# Patient Record
Sex: MALE | Employment: OTHER | URBAN - METROPOLITAN AREA
[De-identification: names, ages, dates, MRNs, and addresses within clinical notes are randomized per-mention and may not be internally consistent; named-entity substitution may affect disease eponyms.]

---

## 2021-02-17 ENCOUNTER — IMMUNIZATION (OUTPATIENT)
Dept: NURSING | Facility: CLINIC | Age: 86
End: 2021-02-17
Payer: COMMERCIAL

## 2021-02-17 PROCEDURE — 91300 PR COVID VAC PFIZER DIL RECON 30 MCG/0.3 ML IM: CPT

## 2021-02-17 PROCEDURE — 0001A PR COVID VAC PFIZER DIL RECON 30 MCG/0.3 ML IM: CPT

## 2021-03-07 ENCOUNTER — HEALTH MAINTENANCE LETTER (OUTPATIENT)
Age: 86
End: 2021-03-07

## 2021-03-10 ENCOUNTER — IMMUNIZATION (OUTPATIENT)
Dept: NURSING | Facility: CLINIC | Age: 86
End: 2021-03-10
Attending: INTERNAL MEDICINE
Payer: COMMERCIAL

## 2021-03-10 PROCEDURE — 91300 PR COVID VAC PFIZER DIL RECON 30 MCG/0.3 ML IM: CPT

## 2021-03-10 PROCEDURE — 0002A PR COVID VAC PFIZER DIL RECON 30 MCG/0.3 ML IM: CPT

## 2021-10-11 ENCOUNTER — HEALTH MAINTENANCE LETTER (OUTPATIENT)
Age: 86
End: 2021-10-11

## 2022-03-27 ENCOUNTER — HEALTH MAINTENANCE LETTER (OUTPATIENT)
Age: 87
End: 2022-03-27

## 2022-09-25 ENCOUNTER — HEALTH MAINTENANCE LETTER (OUTPATIENT)
Age: 87
End: 2022-09-25

## 2023-05-08 ENCOUNTER — HEALTH MAINTENANCE LETTER (OUTPATIENT)
Age: 88
End: 2023-05-08

## 2025-06-03 ENCOUNTER — TELEPHONE (OUTPATIENT)
Dept: OPHTHALMOLOGY | Facility: CLINIC | Age: 89
End: 2025-06-03
Payer: COMMERCIAL

## 2025-06-03 ENCOUNTER — HOSPITAL ENCOUNTER (EMERGENCY)
Facility: CLINIC | Age: 89
Discharge: HOME | End: 2025-06-03
Attending: EMERGENCY MEDICINE | Admitting: EMERGENCY MEDICINE
Payer: COMMERCIAL

## 2025-06-03 ENCOUNTER — OFFICE VISIT (OUTPATIENT)
Dept: OPHTHALMOLOGY | Facility: CLINIC | Age: 89
End: 2025-06-03
Attending: OPHTHALMOLOGY
Payer: COMMERCIAL

## 2025-06-03 VITALS
RESPIRATION RATE: 18 BRPM | HEIGHT: 70 IN | TEMPERATURE: 99 F | BODY MASS INDEX: 21.9 KG/M2 | OXYGEN SATURATION: 97 % | SYSTOLIC BLOOD PRESSURE: 141 MMHG | HEART RATE: 88 BPM | WEIGHT: 153 LBS | DIASTOLIC BLOOD PRESSURE: 76 MMHG

## 2025-06-03 DIAGNOSIS — B00.9 HERPES SIMPLEX: Primary | ICD-10-CM

## 2025-06-03 DIAGNOSIS — B02.30 HERPES ZOSTER WITH OPHTHALMIC COMPLICATION, UNSPECIFIED HERPES ZOSTER EYE DISEASE: ICD-10-CM

## 2025-06-03 DIAGNOSIS — D84.9 IMMUNOSUPPRESSION: ICD-10-CM

## 2025-06-03 DIAGNOSIS — H01.006 ANGULAR BLEPHARITIS OF BOTH EYES: ICD-10-CM

## 2025-06-03 DIAGNOSIS — H01.003 ANGULAR BLEPHARITIS OF BOTH EYES: ICD-10-CM

## 2025-06-03 PROCEDURE — 99283 EMERGENCY DEPT VISIT LOW MDM: CPT

## 2025-06-03 PROCEDURE — G0463 HOSPITAL OUTPT CLINIC VISIT: HCPCS

## 2025-06-03 PROCEDURE — 250N000009 HC RX 250: Performed by: EMERGENCY MEDICINE

## 2025-06-03 PROCEDURE — 250N000013 HC RX MED GY IP 250 OP 250 PS 637: Performed by: EMERGENCY MEDICINE

## 2025-06-03 RX ORDER — VALACYCLOVIR HYDROCHLORIDE 1 G/1
1000 TABLET, FILM COATED ORAL 3 TIMES DAILY
Qty: 21 TABLET | Refills: 0 | Status: SHIPPED | OUTPATIENT
Start: 2025-06-03 | End: 2025-06-10

## 2025-06-03 RX ORDER — VALACYCLOVIR HYDROCHLORIDE 1 G/1
1000 TABLET, FILM COATED ORAL ONCE
Status: COMPLETED | OUTPATIENT
Start: 2025-06-03 | End: 2025-06-03

## 2025-06-03 RX ORDER — PROPARACAINE HYDROCHLORIDE 5 MG/ML
1 SOLUTION/ DROPS OPHTHALMIC ONCE
Status: COMPLETED | OUTPATIENT
Start: 2025-06-03 | End: 2025-06-03

## 2025-06-03 RX ADMIN — PROPARACAINE HYDROCHLORIDE 1 DROP: 5 SOLUTION/ DROPS OPHTHALMIC at 10:01

## 2025-06-03 RX ADMIN — VALACYCLOVIR HYDROCHLORIDE 1000 MG: 1 TABLET, FILM COATED ORAL at 12:14

## 2025-06-03 RX ADMIN — FLUORESCEIN SODIUM 1 STRIP: 1 STRIP OPHTHALMIC at 10:01

## 2025-06-03 ASSESSMENT — VISUAL ACUITY
OS_CC: 20/40
OS: 20/60
OD_CC: 20/50
OS_CC+: -1
OD_CC+: -2
OD: 20/60
METHOD: SNELLEN - LINEAR

## 2025-06-03 ASSESSMENT — TONOMETRY
IOP_METHOD: ICARE
OD_IOP_MMHG: 9
OS_IOP_MMHG: 12

## 2025-06-03 ASSESSMENT — CONF VISUAL FIELD
OD_INFERIOR_NASAL_RESTRICTION: 3
OD_SUPERIOR_TEMPORAL_RESTRICTION: 3
METHOD: COUNTING FINGERS
OS_SUPERIOR_TEMPORAL_RESTRICTION: 3
OS_INFERIOR_NASAL_RESTRICTION: 3
OS_INFERIOR_TEMPORAL_RESTRICTION: 3
OS_SUPERIOR_NASAL_RESTRICTION: 3
OD_INFERIOR_TEMPORAL_RESTRICTION: 3

## 2025-06-03 ASSESSMENT — REFRACTION_WEARINGRX
OD_SPHERE: -1.00
OS_AXIS: 030
OD_AXIS: 018
OS_CYLINDER: +1.25
OS_SPHERE: -2.50
OD_CYLINDER: +1.00
SPECS_TYPE: SVL

## 2025-06-03 ASSESSMENT — ACTIVITIES OF DAILY LIVING (ADL)
ADLS_ACUITY_SCORE: 41

## 2025-06-03 ASSESSMENT — CUP TO DISC RATIO: OD_RATIO: 0.45

## 2025-06-03 NOTE — DISCHARGE INSTRUCTIONS
Go directly to the eye clinic at [facility] at the [facility]. When checking in, let them know the on-call doctor knows about you.

## 2025-06-03 NOTE — ED TRIAGE NOTES
Patient biba for rash/blisters on mouth/face and blurry vision with redness. Primary MD said to go to ER for shingles concern. Patient endorsed N/V/D that started Friday, but those symptoms have gone away.

## 2025-06-03 NOTE — PROGRESS NOTES
Chief Complaint(s) and History of Present Illness(es)       Hsv Keratitis Evaluation              Laterality: left eye    Associated symptoms: dryness, tearing, itching and foreign body sensation.  Negative for eye pain, photophobia and burning    Treatments tried: artificial tears    Pain scale: 0/10    Comments: Pt was seen in ED for possible HSV LE.              Comments    Pt has possible food poisoning last Friday.    Pt first noted tip of LE felt painful yesterday.  Currently BE pain around the edge of lids.  Vision LE was reduced and blurry this morning.  No eye pain.  FBS BE, dryness, occasional tearing, and itching.  Pt started oral Valtrex, 1000 mg 3x/day for 7 days, this morning.  AT's PRN.  No DM.    JOSE Sidhu Indy 3, 2025 1:36 PM                          Reviewed technician HPI and agree with the following addenda: specifies that pain is the corner of the eyelids rather than the eyes. Mild degree of vision change.     Review of systems for the eyes was negative other than the pertinent positives/negatives listed in the HPI.    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 No pertinent past medical history.   On methotrexate    Outpatient medications:  Current Outpatient Medications   Medication Instructions    valACYclovir (VALTREX) 1,000 mg, Oral, 3 TIMES DAILY         Assessment & Plan      Susan Mendez is a 89 year old male with the following diagnoses:    1. Herpes simplex    2. Angular blepharitis of both eyes       Referred for possible ocular VZV. Examination showed bilateral perioral lesions. No lesions elsewhere, no fever or chills to suggest disseminated VZV. Probable HSV. No periocular lesions. No evidence of ocular infection or inflammation. Lateral canthal irritation seems likely caused by angular blepharitis. Blurred  vision of left eye likely related to elschnig pearls in pupil.    Brief glimpse of left optic nerve head through small, surgical pupil appeared cupped and possibly pale. Consider possibility of glaucoma at follow-up.     Patient disposition:   Return if symptoms worsen or fail to improve. Return to see his ophthalmologist at [facility] in about 1 month.     Patient seen and discussed with Dr. Vázquez who agreed with this assessment and plan          Ophthalmology Imaging and Procedure Results:       Man Worrell MD  Resident Physician, PGY-2  Department of Ophthalmology  06/03/2025 3:21 PM

## 2025-06-03 NOTE — TELEPHONE ENCOUNTER
Spoke with ED provider who states patient has eye discomfort in setting of V3 shingles with two lesions next to lateral canthus and with mild conjunctival injection, no staining. Patient immunosuppressed on methotrexate. Plans to start treatment with valtrex PO TID 1000mg and send to acute clinic today or tomorrow. Agreed with this plan. Ok to send by ambulance to our ED after signing out to our ED if unable to come to clinic.    Tony Armstrong MD  Vitreoretinal Surgery Fellow

## 2025-06-03 NOTE — PATIENT INSTRUCTIONS
Take your anti viral medications as prescribed    There was no sign of viral eye infection    Follow-up with eye care provider in about 1 month

## 2025-06-03 NOTE — NURSING NOTE
Chief Complaints and History of Present Illnesses   Patient presents with    Hsv Keratitis Evaluation     Pt was seen in ED for possible HSV LE.     Chief Complaint(s) and History of Present Illness(es)       Hsv Keratitis Evaluation              Laterality: left eye    Associated symptoms: dryness, tearing, itching and foreign body sensation.  Negative for eye pain, photophobia and burning    Treatments tried: artificial tears    Pain scale: 0/10    Comments: Pt was seen in ED for possible HSV LE.              Comments    Pt has possible food poisoning last Friday.    Pt first noted tip of LE felt painful yesterday.  Currently BE pain around the edge of lids.  Vision LE was reduced and blurry this morning.  No eye pain.  FBS BE, dryness, occasional tearing, and itching.  Pt started oral Valtrex, 1000 mg 3x/day for 7 days, this morning.  AT's PRN.  No DM.    JOSE Sidhu Indy 3, 2025 1:36 PM

## 2025-06-03 NOTE — ED NOTES
Bed: ED24  Expected date:   Expected time:   Means of arrival:   Comments:  A 523 89 M rash on face poss shingles mandarin speaking eta 0991

## 2025-06-03 NOTE — ED PROVIDER NOTES
"  Emergency Department Note      History of Present Illness     Chief Complaint   Rash and Eye Problem      HPI   Susan Mendez is a 89 year old male who presents to the ED via EMS for evaluation of a rash and blurry vision. A professional Cantonese  was used. Susan reports yesterday he developed a rash around his mouth and the outer corners of his eyes. Pain preceded development of the rash at both sites. He endorses blurry vision, which is worse today, and pain inside his nose as well. He wears glasses but no contacts use. He had nausea and vomiting 4 days ago which is now resolved.    Independent Historian   None    Review of External Notes   None    Past Medical History     Medical History and Problem List   GERD  Hyperlipidemia  Degeneration of intervertebral disc of lumbar region    Medications   Methotrexate  Omeprazole  Prednisone  Simvastatin    Physical Exam     Patient Vitals for the past 24 hrs:   BP Temp Temp src Pulse Resp SpO2 Height Weight   06/03/25 1008 (!) 141/76 99  F (37.2  C) Oral 88 18 97 % 1.78 m (5' 10.08\") 69.4 kg (153 lb)     Physical Exam  Constitutional: Alert, attentive, GCS 15   HENT:    Eye: 2 small fasciculations skin  just outside the light lateral canthus, right eye slightly injected, there is no fluorescein uptake, intraocular pressure is 10 bilaterally, visual acuity is 20/60 bilaterally, there is small redness to the left lateral canthus.  No clear cells or flare.  Eyes: EOM are normal, anicteric, conjugate gaze  CV: distal extremities warm, well perfused  Chest: Non-labored breathing on RA  Neurological: Alert, attentive, moving all extremities equally.   Skin: Vesicular crusted lesions surrounding the mouth. No intraoral lesions.    Diagnostics     Lab Results   Labs Ordered and Resulted from Time of ED Arrival to Time of ED Departure - No data to display    Imaging   No orders to display     Independent Interpretation   None    ED Course      Medications Administered "   Medications   fluorescein (FUL-EVELYN) ophthalmic strip 1 strip (has no administration in time range)   proparacaine (ALCAINE) 0.5 % ophthalmic solution 1 drop (1 drop Both Eyes $Given 6/3/25 1001)   fluorescein (FUL-EVELYN) ophthalmic strip 1 strip (1 strip Both Eyes $Given 6/3/25 1001)   valACYclovir (VALTREX) tablet 1,000 mg (1,000 mg Oral $Given 6/3/25 1214)       Procedures   Procedures     Discussion of Management   Ophthalmology, Dr. Armstrong    ED Course   ED Course as of 06/03/25 1326   Tue Jun 03, 2025   0988 I obtained history and examined the patient as noted above.    1040 I performed an eye exam.   1139 I spoke with Dr. Armstrong of ophthalmology at [facility] regarding the patient's history and presentation today. Will have patient follow up in clinic.   1154 I rechecked the patient and explained findings.        Additional Documentation  None    Medical Decision Making / Diagnosis     CMS Diagnoses: None    MIPS   None               MDM   Susan Mendez is a 89 year old male who is immunosuppressed on methotrexate secondary to arthritis presenting for evaluation of painful perioral rash associated with bilateral blurry vision and mild in the rash outside of his eyes.  On exam his presentation is consistent with likely shingles, I suspect likely contact spread to  the skin around his right eye and potentially the left eye, he does not wear contacts.  I do not see any clear fluorescein uptake concerning for viral ulcer or clear dendrites.  He does have symmetric vision and his pain resolved with proparacaine suspicious for more potential may be early mild viral keratitis, less likely endophthalmitis.  I do not see indication for labs, he did have some mild vomiting and diarrhea that resolved, though it sounds like this might have been foodborne with several people at his nursing facility that were sick.  Rashes consistent with shingles, he even endorsed pain for several days prior to eruption of the rash.  I have low  suspicion for drug eruption, there is no evidence of secondary bacterial infection.  In discussion with ophthalmology, they have graciously agreed to see the patient in clinic to rule out endophthalmitis or retinitis.  We will plan for p.o. Valtrex, he was taken to ophthalmology clinic by private car with his son driving him.  They were directly there with decision for admission for IV antivirals     Disposition   The patient was discharged.     Diagnosis     ICD-10-CM    1. Herpes zoster with ophthalmic complication, unspecified herpes zoster eye disease  B02.30       2. Immunosuppression  D84.9            Discharge Medications   Discharge Medication List as of 6/3/2025 12:15 PM        START taking these medications    Details   valACYclovir (VALTREX) 1000 mg tablet Take 1 tablet (1,000 mg) by mouth 3 times daily for 7 days., Disp-21 tablet, R-0, Local Print           Benedicto Cruz MD  [employer]  1:26 PM 06/03/25       Scribe Disclosure:  MITCH, Lexii Hull, am serving as a scribe at 10:23 AM on 6/3/2025 to document services personally performed by Benedicto Cruz MD based on my observations and the provider's statements to me.        Benedicto Cruz MD  06/03/25 4047